# Patient Record
Sex: FEMALE | Race: WHITE
[De-identification: names, ages, dates, MRNs, and addresses within clinical notes are randomized per-mention and may not be internally consistent; named-entity substitution may affect disease eponyms.]

---

## 2020-11-18 ENCOUNTER — HOSPITAL ENCOUNTER (EMERGENCY)
Dept: HOSPITAL 56 - MW.ED | Age: 65
Discharge: NURSING, REHAB OR LONG-TERM CARE | End: 2020-11-18
Payer: MEDICARE

## 2020-11-18 VITALS — HEART RATE: 82 BPM | DIASTOLIC BLOOD PRESSURE: 53 MMHG | SYSTOLIC BLOOD PRESSURE: 102 MMHG

## 2020-11-18 DIAGNOSIS — I10: ICD-10-CM

## 2020-11-18 DIAGNOSIS — Z79.84: ICD-10-CM

## 2020-11-18 DIAGNOSIS — J44.9: ICD-10-CM

## 2020-11-18 DIAGNOSIS — Z79.899: ICD-10-CM

## 2020-11-18 DIAGNOSIS — Z88.8: ICD-10-CM

## 2020-11-18 DIAGNOSIS — E11.9: ICD-10-CM

## 2020-11-18 DIAGNOSIS — E78.5: ICD-10-CM

## 2020-11-18 DIAGNOSIS — E78.00: ICD-10-CM

## 2020-11-18 DIAGNOSIS — U07.1: ICD-10-CM

## 2020-11-18 DIAGNOSIS — I25.2: ICD-10-CM

## 2020-11-18 DIAGNOSIS — Z91.048: ICD-10-CM

## 2020-11-18 DIAGNOSIS — J96.01: Primary | ICD-10-CM

## 2020-11-18 DIAGNOSIS — E03.9: ICD-10-CM

## 2020-11-18 LAB
BUN SERPL-MCNC: 30 MG/DL (ref 7–18)
CHLORIDE SERPL-SCNC: 94 MMOL/L (ref 98–107)
CO2 SERPL-SCNC: 23.1 MMOL/L (ref 21–32)
GLUCOSE SERPL-MCNC: 174 MG/DL (ref 74–106)
POTASSIUM SERPL-SCNC: 3.9 MMOL/L (ref 3.5–5.1)
SODIUM SERPL-SCNC: 132 MMOL/L (ref 136–145)

## 2020-11-18 PROCEDURE — 71275 CT ANGIOGRAPHY CHEST: CPT

## 2020-11-18 PROCEDURE — 80053 COMPREHEN METABOLIC PANEL: CPT

## 2020-11-18 PROCEDURE — 83880 ASSAY OF NATRIURETIC PEPTIDE: CPT

## 2020-11-18 PROCEDURE — 71045 X-RAY EXAM CHEST 1 VIEW: CPT

## 2020-11-18 PROCEDURE — 85025 COMPLETE CBC W/AUTO DIFF WBC: CPT

## 2020-11-18 PROCEDURE — 99285 EMERGENCY DEPT VISIT HI MDM: CPT

## 2020-11-18 PROCEDURE — 93005 ELECTROCARDIOGRAM TRACING: CPT

## 2020-11-18 PROCEDURE — U0002 COVID-19 LAB TEST NON-CDC: HCPCS

## 2020-11-18 PROCEDURE — 84484 ASSAY OF TROPONIN QUANT: CPT

## 2020-11-18 PROCEDURE — 36415 COLL VENOUS BLD VENIPUNCTURE: CPT

## 2020-11-18 NOTE — CT
Indication:



Hypoxia



Technique:



Volumetric multidetector CT images of the chest were obtained after the 

administration of IV contrast.



50 cc Isovue 370



Comparison:



Single view chest November 18, 2018



Findings:



The thoracic inlet and thyroid gland are unremarkable.



The thoracic aorta is nonaneurysmal.



There is no central filling defect to suggest pulmonary embolism.



There are reactive mediastinal and hilar lymph nodes.



There is traction bronchiectasis and central bronchial thickening.



There is moderate to severe emphysematous changes of the upper lobes 

predominantly with peripheral honeycombing likely representing a component 

of pulmonary fibrotic changes. There are likely superimposed ground-glass 

opacities which may represent developing multifocal infiltrate.



There is no evidence of pulmonary mass or suspicious pulmonary nodule.



The partially visualized upper abdominal viscera are within normal limits.



The thoracic vertebral body heights are grossly maintained with minimal 

endplate Schmorl`s defects. There is no significant spondylolisthesis or 

displaced fracture.



Impression:



Extensive emphysematous changes and pulmonary fibrotic changes with likely 

minimal superimposed ground-glass opacity which may represent developing 

infiltrate. No evidence of pulmonary embolus.



Please note that all CT scans at this facility use dose modulation, 

iterative reconstruction, and/or weight-based dosing when appropriate to 

reduce radiation dose to as low as reasonably achievable.



Dictated by Fransico Richard MD @ Nov 18 2020  7:03PM



Signed by Dr. Fransico Richard @ Nov 18 2020  7:21PM

## 2020-11-18 NOTE — CR
Indication:



Dyspnea 



Comparison:



Two-view chest April 1, 2019



Technique:



Single AP view chest



Findings:



There is hyperinflation and chronic interstitial change.



There is no focal consolidation, effusion, or pneumothorax.



The cardiomediastinal silhouette is within normal limits.



The bony thorax is grossly intact.



Impression:



No acute cardiopulmonary abnormality.



Dictated by Fransico Richard MD @ Nov 18 2020  6:02PM



Signed by Dr. Fransico Richard @ Nov 18 2020  6:04PM

## 2020-11-18 NOTE — PCM.SN.2
- Free Text/Narrative


Note: 


12-Lead ECG Interpretation 


Acquired: 5:01 PM


Rhythm: Sinus rhythm


Rate: 89 bpm


Axis: Normal


Intervals: Normal


Ectopy: None


RV Strain: No obvious RV strain pattern.


ST Segments/T-Waves: No notable changes


Acute Ischemic Changes: None apparent


Interpretation: No STEMI

## 2020-11-18 NOTE — EDM.PDOC
<Frankie Delaney - Last Filed: 11/18/20 19:46>





ED HPI GENERAL MEDICAL PROBLEM





- General


Chief Complaint: Respiratory Problem


Stated Complaint: SOB


Time Seen by Provider: 11/18/20 16:42


Source of Information: Reports: Patient, Old Records


History Limitations: Reports: No Limitations





- History of Present Illness


INITIAL COMMENTS - FREE TEXT/NARRATIVE: 


65-year-old female with a past medical history of COPD and hypertension, 

diabetes mellitus, hypothyroidism, hyperlipidemia presenting with shortness of 

breath and infectious symptoms.  She reports a 6-day history of a cough, fever 

at home, scant diarrhea, myalgias, and shortness of breath.  The shortness of 

breath worsened today, which prompted her emergency department presentation.  No

known sick contacts.  She was tested for COVID-19 2 days ago but has not had a 

result yet.





Denies headache, neck pain, chest discomfort, hemoptysis, leg swelling, 

vomiting, hematemesis, bloody stools, dysuria, or hematuria.  She does not use 

oxygen at home for her COPD.





ROS: A 10-point review of systems was negative, except as noted in the HPI (or 

in the ROS section of this note).





Past medical history: Reviewed, no additional pertinent history.


Surgical history: Reviewed in system, no additional pertinent history.


Social history: Reviewed in system, no additional pertinent history.


Family history: Reviewed in system, no additional pertinent history.


________________________________________________________________________________





PHYSICAL EXAM


Vital signs reviewed.  Nursing notes reviewed.


Constitutional: Awake, alert, non-distressed.


Head: Normocephalic, atraumatic.


Eyes: EOMI, conjunctiva normal, no discharge, no scleral icterus.


Ears, Nose, Throat: External ears and nose normal, moist oral mucosa.


Cardiovascular: 2+ radial pulse, capillary refill less than 2 seconds.  No lower

extremity edema.


Pulmonary: Tachypneic, mildly increased work of breathing, no accessory muscle 

use.


Abdomen/GI: Soft, nontender, nondistended, no guarding or rigidity, no masses.


Musculoskeletal: No deformities.


Integumentary: Appropriate color for ethnicity, warm, dry, no pallor or 

jaundice, no rash.


Neurologic: Alert, answering questions appropriately, normal speech, no facial 

droop, moving all extremities well.


Psychiatric: Appropriate mood and affect, normal thought process.





This patient was seen and evaluated during the 2020 SARS-CoV-2 novel coronavirus

pandemic period.  Community viral transmission is ongoing at time of this 

encounter and the emergency department is operating under pandemic response 

procedures.


  ** Center back


Pain Score (Numeric/FACES): 8





- Related Data


                                    Allergies











Allergy/AdvReac Type Severity Reaction Status Date / Time


 


nickel [Nickel] Allergy  Rash Verified 11/18/20 16:50


 


catgut sutures Allergy  Cannot Uncoded 11/18/20 16:50





   Remember  











Home Meds: 


                                    Home Meds





Venlafaxine [Effexor] 150 mg PO DAILY 04/26/14 [History]


atorvaSTATin Calcium [Atorvastatin Calcium] 10 mg PO DAILY 04/26/14 [History]


metFORMIN HCl [Metformin HCl] 4,000 mg PO BID 04/26/14 [History]


Budesonide/Formoterol [Symbicort 160-4.5 MCG] 1 puff INH BID 11/18/20 [History]


Calcium Carbonate [Calcium] 600 mg PO DAILY 11/18/20 [History]


Chlorthalidone 25 mg PO DAILY 11/18/20 [History]


Diltiazem [Tiazac] 240 mg PO DAILY 11/18/20 [History]


Levothyroxine 25 mcg PO DAILY 11/18/20 [History]


Omeprazole 40 mg PO DAILY 11/18/20 [History]











Past Medical History


Cardiovascular History: Reports: High Cholesterol, Hypertension, MI, Stents


Respiratory History: Reports: COPD


Endocrine/Metabolic History: Reports: Diabetes, Type II





- Infectious Disease History


Infectious Disease History: Reports: Chicken Pox, Measles, Mumps





- Past Surgical History


Other Musculoskeletal Surgeries/Procedures:: R ankle, 7 R knee surgeries, 1 L 

knee surgery, Carpel tunnel repair bilat, spinal stenosis with repair





Social & Family History





- Family History


Family Medical History: No Pertinent Family History





- Tobacco Use


Tobacco Use Status *Q: Never Tobacco User





- Caffeine Use


Caffeine Use: Reports: Coffee, Soda





- Recreational Drug Use


Recreational Drug Use: Yes


Recreational Drug Type: Reports: Marijuana/Hashish


Recreational Drug Use Frequency: Socially





ED ROS GENERAL





- Review of Systems


Review Of Systems: See Below





ED EXAM, GENERAL





- Physical Exam


Exam: See Below





Course





- Vital Signs


Text/Narrative:: 


Differential diagnosis includes but is not limited to: COVID-19 pneumonia, 

bacterial pneumonia, sepsis, less likely acute coronary syndrome or congestive 

heart failure, less likely pulmonary embolism, COPD exacerbation, asthma 

exacerbation, and many others.





5:53 PM: Labs show normal cell lines.  Mild hyponatremia 132.  Creatinine mildly

 elevated at 1.2.  Glucose 174 with normal carbon dioxide.  AST mildly elevated 

at 59.  Troponin negative.  Waiting for BNP, COVID test, and chest x-ray.





6:26 PM: Chest x-ray is clear however the patient is persistently hypoxic.  We 

are going to obtain a CT pulmonary angiogram to evaluate for pulmonary embolism 

or other occult pneumonia.  Her COVID-19 test is positive.  We are going to give

 p.o. dexamethasone. IV remdesivir not available as patient will need to be 

transferred to another hospital as we have no capacity here.





The patient remained in the ED through the end of my shift. Refer to my 

colleague Dr. Mayo's note for the disposition.





Departure





- Departure


Disposition: DC/Tfer to Acute [facility]


Condition: Good


Clinical Impression: 


 Hypoxia, Acute respiratory failure due to COVID-19








- Discharge Information


Referrals: 


Alberto Khan MD [Primary Care Provider] - 


Forms:  ED Department Discharge





Sepsis Event Note (ED)





- Evaluation


Sepsis Screening Result: No Definite Risk





<Carrillo Mayo - Last Filed: 11/18/20 19:55>





Course





- Vital Signs


Last Recorded V/S: 


                                Last Vital Signs











Temp  99.9 F   11/18/20 19:41


 


Pulse  84   11/18/20 19:41


 


Resp  20   11/18/20 19:41


 


BP  126/85   11/18/20 19:41


 


Pulse Ox  94 L  11/18/20 19:41














- Orders/Labs/Meds


Orders: 


                               Active Orders 24 hr











 Category Date Time Status


 


 EKG Documentation Completion [RC] STAT Care  11/18/20 17:07 Active


 


 Pulse Oximetry [RC] ASDIRECTED Care  11/18/20 17:07 Active


 


 CORONAVIRUS COVID-19 PCR PHL Stat Lab  11/18/20 17:37 Received


 


 Sodium Chloride 0.9% [Saline Flush] Med  11/18/20 17:07 Active





 10 ml FLUSH ASDIRECTED PRN   


 


 Sodium Chloride 0.9% [Saline Flush] Med  11/18/20 17:07 Active





 2.5 ml FLUSH ASDIRECTED PRN   


 


 Saline Lock Insert [OM.PC] Stat Oth  11/18/20 17:07 Ordered








                                Medication Orders





Sodium Chloride (Saline Flush)  10 ml FLUSH ASDIRECTED PRN


   PRN Reason: Keep Vein Open


   Last Admin: 11/18/20 18:28  Dose: 10 ml


   Documented by: [id number]


Sodium Chloride (Saline Flush)  2.5 ml FLUSH ASDIRECTED PRN


   PRN Reason: Keep Vein Open


   Last Admin: 11/18/20 18:28  Dose: 2.5 ml


   Documented by: [id number]








Labs: 


                                Laboratory Tests











  11/18/20 11/18/20 11/18/20 Range/Units





  17:04 17:04 17:04 


 


WBC  6.91    (4.0-11.0)  K/uL


 


RBC  5.01    (4.30-5.90)  M/uL


 


Hgb  15.0    (12.0-16.0)  g/dL


 


Hct  43.3    (36.0-46.0)  %


 


MCV  86.4    (80.0-98.0)  fL


 


MCH  29.9    (27.0-32.0)  pg


 


MCHC  34.6    (31.0-37.0)  g/dL


 


RDW Std Deviation  46.5    (28.0-62.0)  fl


 


RDW Coeff of Maximo  15    (11.0-15.0)  %


 


Plt Count  268    (150-400)  K/uL


 


MPV  10.40    (7.40-12.00)  fL


 


Neut % (Auto)  67.0    (48.0-80.0)  %


 


Lymph % (Auto)  19.8    (16.0-40.0)  %


 


Mono % (Auto)  13.0    (0.0-15.0)  %


 


Eos % (Auto)  0.1    (0.0-7.0)  %


 


Baso % (Auto)  0.1    (0.0-1.5)  %


 


Neut # (Auto)  4.6    (1.4-5.7)  K/uL


 


Lymph # (Auto)  1.4    (0.6-2.4)  K/uL


 


Mono # (Auto)  0.9 H    (0.0-0.8)  K/uL


 


Eos # (Auto)  0.0    (0.0-0.7)  K/uL


 


Baso # (Auto)  0.0    (0.0-0.1)  K/uL


 


Nucleated RBC %  0.0    /100WBC


 


Nucleated RBCs #  0    K/uL


 


Sodium   132 L   (136-145)  mmol/L


 


Potassium   3.9   (3.5-5.1)  mmol/L


 


Chloride   94 L   ()  mmol/L


 


Carbon Dioxide   23.1   (21.0-32.0)  mmol/L


 


BUN   30 H   (7.0-18.0)  mg/dL


 


Creatinine   1.2 H   (0.6-1.0)  mg/dL


 


Est Cr Clr Drug Dosing   32.68   mL/min


 


Estimated GFR (MDRD)   44.8   ml/min


 


Glucose   174 H   ()  mg/dL


 


Calcium   9.1   (8.5-10.1)  mg/dL


 


Total Bilirubin   0.5   (0.2-1.0)  mg/dL


 


AST   59 H   (15-37)  IU/L


 


ALT   55   (14-63)  IU/L


 


Alkaline Phosphatase   91   ()  U/L


 


Troponin I   < 0.050   (0.000-0.056)  ng/mL


 


B-Natriuretic Peptide    9  (<100)  PG/ML


 


Total Protein   7.9   (6.4-8.2)  g/dL


 


Albumin   3.5   (3.4-5.0)  g/dL


 


Globulin   4.4 H   (2.6-4.0)  g/dL


 


Albumin/Globulin Ratio   0.8 L   (0.9-1.6)  


 


SARS CoV-2 RNA Rapid DANIELE     (NEGATIVE)  














  11/18/20 Range/Units





  17:37 


 


WBC   (4.0-11.0)  K/uL


 


RBC   (4.30-5.90)  M/uL


 


Hgb   (12.0-16.0)  g/dL


 


Hct   (36.0-46.0)  %


 


MCV   (80.0-98.0)  fL


 


MCH   (27.0-32.0)  pg


 


MCHC   (31.0-37.0)  g/dL


 


RDW Std Deviation   (28.0-62.0)  fl


 


RDW Coeff of Maximo   (11.0-15.0)  %


 


Plt Count   (150-400)  K/uL


 


MPV   (7.40-12.00)  fL


 


Neut % (Auto)   (48.0-80.0)  %


 


Lymph % (Auto)   (16.0-40.0)  %


 


Mono % (Auto)   (0.0-15.0)  %


 


Eos % (Auto)   (0.0-7.0)  %


 


Baso % (Auto)   (0.0-1.5)  %


 


Neut # (Auto)   (1.4-5.7)  K/uL


 


Lymph # (Auto)   (0.6-2.4)  K/uL


 


Mono # (Auto)   (0.0-0.8)  K/uL


 


Eos # (Auto)   (0.0-0.7)  K/uL


 


Baso # (Auto)   (0.0-0.1)  K/uL


 


Nucleated RBC %   /100WBC


 


Nucleated RBCs #   K/uL


 


Sodium   (136-145)  mmol/L


 


Potassium   (3.5-5.1)  mmol/L


 


Chloride   ()  mmol/L


 


Carbon Dioxide   (21.0-32.0)  mmol/L


 


BUN   (7.0-18.0)  mg/dL


 


Creatinine   (0.6-1.0)  mg/dL


 


Est Cr Clr Drug Dosing   mL/min


 


Estimated GFR (MDRD)   ml/min


 


Glucose   ()  mg/dL


 


Calcium   (8.5-10.1)  mg/dL


 


Total Bilirubin   (0.2-1.0)  mg/dL


 


AST   (15-37)  IU/L


 


ALT   (14-63)  IU/L


 


Alkaline Phosphatase   ()  U/L


 


Troponin I   (0.000-0.056)  ng/mL


 


B-Natriuretic Peptide   (<100)  PG/ML


 


Total Protein   (6.4-8.2)  g/dL


 


Albumin   (3.4-5.0)  g/dL


 


Globulin   (2.6-4.0)  g/dL


 


Albumin/Globulin Ratio   (0.9-1.6)  


 


SARS CoV-2 RNA Rapid DANIELE  POSITIVE H  (NEGATIVE)  











Meds: 


Medications











Generic Name Dose Route Start Last Admin





  Trade Name Freq  PRN Reason Stop Dose Admin


 


Sodium Chloride  10 ml  11/18/20 17:07  11/18/20 18:28





  Saline Flush  FLUSH   10 ml





  ASDIRECTED PRN   Administration





  Keep Vein Open  


 


Sodium Chloride  2.5 ml  11/18/20 17:07  11/18/20 18:28





  Saline Flush  FLUSH   2.5 ml





  ASDIRECTED PRN   Administration





  Keep Vein Open  














Discontinued Medications














Generic Name Dose Route Start Last Admin





  Trade Name Freq  PRN Reason Stop Dose Admin


 


Acetaminophen  1,000 mg  11/18/20 18:18  11/18/20 18:28





  Tylenol Extra Strength  PO  11/18/20 18:19  1,000 mg





  ONETIME ONE   Administration


 


Dexamethasone  6 mg  11/18/20 18:25  11/18/20 19:34





  Dexamethasone  PO  11/18/20 18:26  6 mg





  ONETIME ONE   Administration


 


Remdesivir 200 mg/ Sodium  250 mls @ 250 mls/hr  11/18/20 18:26  11/18/20 18:56





  Chloride  IV  11/18/20 18:27  Not Given





  ONETIME ONE  


 


Ibuprofen  400 mg  11/18/20 18:18  11/18/20 18:28





  Motrin  PO  11/18/20 18:19  400 mg





  ONETIME ONE   Administration


 


Iopamidol  50 ml  11/18/20 19:01  11/18/20 19:02





  Isovue Multipack-370 (76%)  IVPUSH  11/18/20 19:02  50 ml





  ONETIME STA   Administration














Departure





- Departure


Time of Disposition: 19:54


Condition: Good





Sepsis Event Note (ED)





- Focused Exam


Vital Signs: 


                                   Vital Signs











  Temp Pulse Resp BP Pulse Ox


 


 11/18/20 19:41  99.9 F  84  20  126/85  94 L


 


 11/18/20 18:21   88  18  122/65  94 L


 


 11/18/20 17:51   90   120/63  90 L


 


 11/18/20 17:01  96.8 F L  92  16  145/68 H  93 L














- Assessment/Plan


Assessment:: 


Pt received in signout from Dr. Delaney at 1900.  PT is a 65yoF with a h/o COPD 

presenting with acute hypoxic respiratory failure in the setting of COVID-19 

infection. Pt's RA saturation was in the low 80s with unremarkable CXR.  Given 

this CTPA is pending to assess for any PE.  Labs are w/out evidence of right 

heart strain. Pt is now satting well on NC.  Pt will need to be transferred as 

we do not have capacity for additional COVID19 admissions.





1930: CT pulmonary angiography demonstrates emphysematous changes and scattered 

groundglass opacities consistent with COVID-19.  There is no PE.  Patient 

relatively comfortable on 3 L nasal cannula at this time.  She states that she 

takes Advair and frequent doses of prednisone for her COPD.  She has a rescue 

inhaler but almost never needs it.  She has been on prednisone more often 

recently in an attempt to control her chronic cough.  She says that it worked 

for a few days and then stopped working again.  Patient consents to transfer we 

will start the transfer process.  On my assessment her WOB is good, she is 

breathing comfortably on 4 L NC with O2 sats 93-95.  She is speaking in full 

complete sentences.





1934: [facility] and [facility] are [facility] at capacity.  [facility] has only 3 COVID beds left and 5 COVID patients waiting in their ED so 

a bed is unlikely.  Pt discussed with [facility].   The patient was accepted 

for transfer by Dr. Fields.  Patient has already been given decadron.  Dr. Fields and 

I agree that pt would be a candidate for Remdesivir.  Unfortunately, because our

 local supply is so limited, hospital policy will not allow us to give 

Remdesivir to a patient that is being transferred.  She will need to receive it 

at [place].  Pt is felt stable for transfer at this time.  We are awaiting bed 

assignment.

## 2021-04-22 ENCOUNTER — HOSPITAL ENCOUNTER (EMERGENCY)
Dept: HOSPITAL 56 - MW.ED | Age: 66
Discharge: HOME | End: 2021-04-22
Payer: MEDICARE

## 2021-04-22 VITALS — SYSTOLIC BLOOD PRESSURE: 130 MMHG | DIASTOLIC BLOOD PRESSURE: 66 MMHG | HEART RATE: 92 BPM

## 2021-04-22 DIAGNOSIS — M54.32: Primary | ICD-10-CM

## 2021-04-22 PROCEDURE — 99283 EMERGENCY DEPT VISIT LOW MDM: CPT

## 2021-04-22 PROCEDURE — 96372 THER/PROPH/DIAG INJ SC/IM: CPT

## 2021-04-22 NOTE — EDM.PDOC
ED HPI GENERAL MEDICAL PROBLEM





- General


Chief Complaint: Back Pain or Injury


Stated Complaint: BACK SPASMS


Time Seen by Provider: 04/22/21 03:09





- History of Present Illness


INITIAL COMMENTS - FREE TEXT/NARRATIVE: 











ED HPI GENERAL MEDICAL PROBLEM





- General


Chief Complaint: Back Pain or Injury


Stated Complaint: LEFT FOOT SHARP PAIN


Time Seen by Provider: 04/22/21 03:10





- History of Present Illness


INITIAL COMMENTS - FREE TEXT/NARRATIVE: 





History of present illness:


[] Patient is 2 days of severe pain in left hip.  It radiates down the left leg.

  It causes spasm.  Changing the position over and over helps a little bit.  The

 patient's had episodes in the past and they responded to anti-inflammatory 

medicine the muscle relaxers.  The patient has no neurologic findings.  She also

 says she has incontinence of urine but she has control of your sphincter to 

some degree but she just cannot make it to the bathroom in time and this is 

chronic and not new.  The patient has no loss of control of her bowels.  She has

 no sensory or motor loss below the waist.  No dysuria or other urinary symptoms

 and no new urinary symptoms.  There is been no history of injury and she has no

 history of malignancy.





Review of systems: 


As per history of present illness and below otherwise all systems reviewed and 

negative.





Past medical history: 


As per history of present illness and as reviewed below otherwise 

noncontributory.





Surgical history: 


As per history of present illness and as reviewed below otherwise 

noncontributory.





Social history: 


No reported history of drug or alcohol abuse.





Family history: 


As per history of present illness and as reviewed below otherwise nonc

ontributory.





Physical exam:


Constitutional - well developed, well-nourished and in no acute distress


HEENT - normocephalic, no evidence of trauma - external nose and mouth normal - 

no mass in neck and no JVD - mucosae moist





EYES - full EOM, PERRL, no icterus - no evidence of inflammation, injection, or 

drainage





Respiratory - no respiratory distress, equal bilateral expansion, lungs clear to

 auscultation and no abnormal lung sounds





Cardiovascular - Regular Rhythm with S1 and S2 appreciated and no murmur, gallop

 or rub.





GI - abdomen soft without distension or organomegaly - normal bowel sounds - no 

guard or rebound





Musculoskeletal straight leg raise on the right to 45 degrees causes a little 

bit of pain crossover to the left side of the hip.  She is tender in the sciatic

 region and posterior thigh.  Straight leg raise on the left is negative.  No 

gross deformity of long bones or joints - no tenderness, swelling or edema





Neurologic - Alert and oriented times four - CN II-XII grossly intact - motor 

sensory and coordination symmetrically normal





Psychiatric - appropriate mood and affect with normal thought content





Hematologic - No petechiae or purpura - mucosa appropriate color and sclera not 

pale - normal nail bed color and refill





Integument - no rash or evidence of trauma - normal turgor





Diagnostics:


[]





Therapeutics:


[]





Impression: 


[]





Plan:


[]





Definitive disposition and diagnosis as appropriate pending reevaluation and 

review of above.








  ** Left Leg


Pain Score (Numeric/FACES): 9





- Related Data


                                    Allergies











Allergy/AdvReac Type Severity Reaction Status Date / Time


 


No Known Allergies Allergy   Verified 04/22/21 02:55











Home Meds: 


                                    Home Meds





diazePAM [Valium] 5 mg PO TID PRN #15 tab 04/22/21 [Rx]











Past Medical History





- Past Health History


Medical/Surgical History: Denies Medical/Surgical History


HEENT History: Reports: None


Cardiovascular History: Reports: None


Respiratory History: Reports: None


Gastrointestinal History: Reports: None


Genitourinary History: Reports: None


OB/GYN History: Reports: None


Musculoskeletal History: Reports: None


Neurological History: Reports: None


Psychiatric History: Reports: None


Endocrine/Metabolic History: Reports: None


Hematologic History: Reports: None


Immunologic History: Reports: None


Oncologic (Cancer) History: Reports: None


Dermatologic History: Reports: None





- Infectious Disease History


Infectious Disease History: Reports: None





- Past Surgical History


Head Surgeries/Procedures: Reports: None


HEENT Surgical History: Reports: Tonsillectomy


GI Surgical History: Reports: None





Social & Family History





- Family History


Family Medical History: No Pertinent Family History





- Tobacco Use


Tobacco Use Status *Q: Never Tobacco User





- Caffeine Use


Caffeine Use: Reports: None





- Recreational Drug Use


Recreational Drug Use: No





ED ROS GENERAL





- Review of Systems


Review Of Systems: Comprehensive ROS is negative, except as noted in HPI.





ED EXAM, GENERAL





- Physical Exam


Exam: See Below


Free Text/Narrative:: 





My physical exam is in the HPI





Course





- Vital Signs


Last Recorded V/S: 





                                Last Vital Signs











Temp  36.1 C   04/22/21 02:55


 


Pulse  95   04/22/21 02:55


 


Resp  18   04/22/21 02:55


 


BP  128/49 L  04/22/21 02:55


 


Pulse Ox  97   04/22/21 02:55














- Orders/Labs/Meds


Orders: 





                               Active Orders 24 hr











 Category Date Time Status


 


 Ketorolac [Toradol] Med  04/22/21 03:19 Once





 30 mg IM ONETIME ONE   


 


 diazePAM [Valium] Med  04/22/21 03:20 Once





 5 mg PO ONETIME ONE   








                                Medication Orders





Diazepam (Diazepam 2 Mg Tab)  5 mg PO ONETIME ONE


   Stop: 04/22/21 03:21


Ketorolac Tromethamine (Ketorolac 30 Mg/Ml Sdv)  30 mg IM ONETIME ONE


   Stop: 04/22/21 03:20








Meds: 





Medications











Generic Name Dose Route Start Last Admin





  Trade Name John  PRN Reason Stop Dose Admin


 


Diazepam  5 mg  04/22/21 03:20 





  Diazepam 2 Mg Tab  PO  04/22/21 03:21 





  ONETIME ONE  


 


Ketorolac Tromethamine  30 mg  04/22/21 03:19 





  Ketorolac 30 Mg/Ml Sdv  IM  04/22/21 03:20 





  ONETIME ONE  














Departure





- Departure


Time of Disposition: 03:45


Disposition: Home, Self-Care 01


Condition: Good


Clinical Impression: 


 Sciatica








- Discharge Information


Instructions:  Sciatica, Easy-to-Read


Referrals: 


Anamaria Lares NP [Primary Care Provider] - 


Additional Instructions: 


Take 500 mg naproxen or 600 mg ibuprofen as directed in the over the counter 

forearm along with a muscle relaxer.  Be careful a muscle relaxer will also make

you relaxed and may make you tired.  





[facility]


[address] [place] [address]


Phone: [phone]


Fax: [phone]








[address] [address]


Phone: [phone]


Fax: [phone]





The following information is given to patients seen in the emergency department 

who are being discharged to home. This information is to outline your options 

for follow-up care. We provide all patients seen in our emergency department 

with a follow-up referral.





The need for follow-up, as well as the timing and circumstances, are variable 

depending upon the specifics of your emergency department visit.





If you don't have a primary care physician on staff, we will provide you with a 

referral. We always advise you to contact your personal physician following an 

emergency department visit to inform them of the circumstance of the visit and 

for follow-up with them and/or the need for any referrals to a consulting 

specialist.





The emergency department will also refer you to a specialist when appropriate. 

This referral assures that you have the opportunity for follow-up care with a 

specialist. All of these measure are taken in an effort to provide you with 

optimal care, which includes your follow-up.





Under all circumstances we always encourage you to contact your private 

physician who remains a resource for coordinating your care. When calling for 

follow-up care, please make the office aware that this follow-up is from your 

recent emergency room visit. If for any reason you are refused follow-up, please

contact the [facility] Emergency Department

at [phone] and asked to speak to the emergency department charge nurse.








Sepsis Event Note (ED)





- Evaluation


Sepsis Screening Result: No Definite Risk





- Focused Exam


Vital Signs: 





                                   Vital Signs











  Temp Pulse Resp BP Pulse Ox


 


 04/22/21 02:55  36.1 C  95  18  128/49 L  97














- My Orders


Last 24 Hours: 





My Active Orders





04/22/21 03:19


Ketorolac [Toradol]   30 mg IM ONETIME ONE 





04/22/21 03:20


diazePAM [Valium]   5 mg PO ONETIME ONE 














- Assessment/Plan


Last 24 Hours: 





My Active Orders





04/22/21 03:19


Ketorolac [Toradol]   30 mg IM ONETIME ONE 





04/22/21 03:20


diazePAM [Valium]   5 mg PO ONETIME ONE 














  ** lower back


Pain Score (Numeric/FACES): 10





- Related Data


                                    Allergies











Allergy/AdvReac Type Severity Reaction Status Date / Time


 


nickel [Nickel] Allergy  Rash Verified 04/22/21 02:55


 


catgut sutures Allergy  Cannot Uncoded 04/22/21 02:55





   Remember  











Home Meds: 


                                    Home Meds





Venlafaxine [Effexor] 150 mg PO DAILY 04/26/14 [History]


atorvaSTATin Calcium [Atorvastatin Calcium] 10 mg PO DAILY 04/26/14 [History]


metFORMIN HCl [Metformin HCl] 4,000 mg PO BID 04/26/14 [History]


Budesonide/Formoterol [Symbicort 160-4.5 MCG] 1 puff INH BID 11/18/20 [History]


Calcium Carbonate [Calcium] 600 mg PO DAILY 11/18/20 [History]


Chlorthalidone 25 mg PO DAILY 11/18/20 [History]


Diltiazem [Tiazac] 240 mg PO DAILY 11/18/20 [History]


Levothyroxine 25 mcg PO DAILY 11/18/20 [History]


Omeprazole 40 mg PO DAILY 11/18/20 [History]


diazePAM [Valium] 5 mg PO TID PRN #15 tab 04/22/21 [Rx]











Past Medical History


HEENT History: Reports: None


Cardiovascular History: Reports: High Cholesterol, Hypertension, MI, Stents


Respiratory History: Reports: COPD


Gastrointestinal History: Reports: None


Genitourinary History: Reports: None


OB/GYN History: Reports: None


Musculoskeletal History: Reports: None


Neurological History: Reports: None


Psychiatric History: Reports: None


Endocrine/Metabolic History: Reports: Diabetes, Type II


Insulin Pump Model and : None


Hematologic History: Reports: None


Immunologic History: Reports: None


Oncologic (Cancer) History: Reports: None


Dermatologic History: Reports: None





- Infectious Disease History


Infectious Disease History: Reports: Chicken Pox, Measles, Mumps





- Past Surgical History


Other Musculoskeletal Surgeries/Procedures:: R ankle, 7 R knee surgeries, 1 L 

knee surgery, Carpel tunnel repair bilat, spinal stenosis with repair





Social & Family History





- Family History


Family Medical History: No Pertinent Family History





- Caffeine Use


Caffeine Use: Reports: Coffee, Soda





- Recreational Drug Use


Recreational Drug Use: No





ED ROS GENERAL





- Review of Systems


Review Of Systems: Comprehensive ROS is negative, except as noted in HPI.





ED EXAM, GENERAL





- Physical Exam


Exam: See Below


Free Text/Narrative:: 





My physical exam is in the HPI





Course





- Vital Signs


Text/Narrative:: 











ED HPI GENERAL MEDICAL PROBLEM





- General


Chief Complaint: Back Pain or Injury


Stated Complaint: LEFT FOOT SHARP PAIN


Time Seen by Provider: 04/22/21 03:10





- History of Present Illness


INITIAL COMMENTS - FREE TEXT/NARRATIVE: 





History of present illness:


[] Patient is 2 days of severe pain in left hip.  It radiates down the left leg.

  It causes spasm.  Changing the position over and over helps a little bit.  The

 patient's had episodes in the past and they responded to anti-inflammatory 

medicine the muscle relaxers.  The patient has no neurologic findings.  She also

 says she has incontinence of urine but she has control of your sphincter to 

some degree but she just cannot make it to the bathroom in time and this is 

chronic and not new.  The patient has no loss of control of her bowels.  She has

 no sensory or motor loss below the waist.  No dysuria or other urinary symptoms

 and no new urinary symptoms.  There is been no history of injury and she has no

 history of malignancy.





Review of systems: 


As per history of present illness and below otherwise all systems reviewed and 

negative.





Past medical history: 


As per history of present illness and as reviewed below otherwise 

noncontributory.





Surgical history: 


As per history of present illness and as reviewed below otherwise noncont

ributory.





Social history: 


No reported history of drug or alcohol abuse.





Family history: 


As per history of present illness and as reviewed below otherwise 

noncontributory.





Physical exam:


Constitutional - well developed, well-nourished and in no acute distress


HEENT - normocephalic, no evidence of trauma - external nose and mouth normal - 

no mass in neck and no JVD - mucosae moist





EYES - full EOM, PERRL, no icterus - no evidence of inflammation, injection, or 

drainage





Respiratory - no respiratory distress, equal bilateral expansion, lungs clear to

 auscultation and no abnormal lung sounds





Cardiovascular - Regular Rhythm with S1 and S2 appreciated and no murmur, gallop

 or rub.





GI - abdomen soft without distension or organomegaly - normal bowel sounds - no 

guard or rebound





Musculoskeletal straight leg raise on the right to 45 degrees causes a little 

bit of pain crossover to the left side of the hip.  She is tender in the sciatic

 region and posterior thigh.  Straight leg raise on the left is negative.  No 

gross deformity of long bones or joints - no tenderness, swelling or edema





Neurologic - Alert and oriented times four - CN II-XII grossly intact - motor 

sensory and coordination symmetrically normal





Psychiatric - appropriate mood and affect with normal thought content





Hematologic - No petechiae or purpura - mucosa appropriate color and sclera not 

pale - normal nail bed color and refill





Integument - no rash or evidence of trauma - normal turgor





Diagnostics:


[]





Therapeutics:


[]





Impression: 


[]





Plan:


[]





Definitive disposition and diagnosis as appropriate pending reevaluation and 

review of above.








  ** Left Leg


Pain Score (Numeric/FACES): 9





- Related Data


                                    Allergies











Allergy/AdvReac Type Severity Reaction Status Date / Time


 


No Known Allergies Allergy   Verified 04/22/21 02:55











Home Meds: 


                                    Home Meds





diazePAM [Valium] 5 mg PO TID PRN #15 tab 04/22/21 [Rx]











Past Medical History





- Past Health History


Medical/Surgical History: Denies Medical/Surgical History


HEENT History: Reports: None


Cardiovascular History: Reports: None


Respiratory History: Reports: None


Gastrointestinal History: Reports: None


Genitourinary History: Reports: None


OB/GYN History: Reports: None


Musculoskeletal History: Reports: None


Neurological History: Reports: None


Psychiatric History: Reports: None


Endocrine/Metabolic History: Reports: None


Hematologic History: Reports: None


Immunologic History: Reports: None


Oncologic (Cancer) History: Reports: None


Dermatologic History: Reports: None





- Infectious Disease History


Infectious Disease History: Reports: None





- Past Surgical History


Head Surgeries/Procedures: Reports: None


HEENT Surgical History: Reports: Tonsillectomy


GI Surgical History: Reports: None





Social & Family History





- Family History


Family Medical History: No Pertinent Family History





- Tobacco Use


Tobacco Use Status *Q: Never Tobacco User





- Caffeine Use


Caffeine Use: Reports: None





- Recreational Drug Use


Recreational Drug Use: No





ED ROS GENERAL





- Review of Systems


Review Of Systems: Comprehensive ROS is negative, except as noted in HPI.





ED EXAM, GENERAL





- Physical Exam


Exam: See Below


Free Text/Narrative:: 





My physical exam is in the HPI





Course





- Vital Signs


Last Recorded V/S: 





                                Last Vital Signs











Temp  36.1 C   04/22/21 02:55


 


Pulse  95   04/22/21 02:55


 


Resp  18   04/22/21 02:55


 


BP  128/49 L  04/22/21 02:55


 


Pulse Ox  97   04/22/21 02:55














- Orders/Labs/Meds


Orders: 





                               Active Orders 24 hr











 Category Date Time Status


 


 Ketorolac [Toradol] Med  04/22/21 03:19 Once





 30 mg IM ONETIME ONE   


 


 diazePAM [Valium] Med  04/22/21 03:20 Once





 5 mg PO ONETIME ONE   








                                Medication Orders





Diazepam (Diazepam 2 Mg Tab)  5 mg PO ONETIME ONE


   Stop: 04/22/21 03:21


Ketorolac Tromethamine (Ketorolac 30 Mg/Ml Sdv)  30 mg IM ONETIME ONE


   Stop: 04/22/21 03:20








Meds: 





Medications











Generic Name Dose Route Start Last Admin





  Trade Name John  PRN Reason Stop Dose Admin


 


Diazepam  5 mg  04/22/21 03:20 





  Diazepam 2 Mg Tab  PO  04/22/21 03:21 





  ONETIME ONE  


 


Ketorolac Tromethamine  30 mg  04/22/21 03:19 





  Ketorolac 30 Mg/Ml Sdv  IM  04/22/21 03:20 





  ONETIME ONE  














Departure





- Departure


Time of Disposition: 03:45


Disposition: Home, Self-Care 01


Condition: Good


Clinical Impression: 


 Sciatica








- Discharge Information


Instructions:  Sciatica, Easy-to-Read


Referrals: 


Anamaria Lares NP [Primary Care Provider] - 


Additional Instructions: 


Take 500 mg naproxen or 600 mg ibuprofen as directed in the over the counter 

forearm along with a muscle relaxer.  Be careful a muscle relaxer will also make

you relaxed and may make you tired.  





[facility]


[address] [place] [address]


Phone: [phone]


Fax: [phone]








[place] [address]


Phone: [phone]


Fax: [phone]





The following information is given to patients seen in the emergency department 

who are being discharged to home. This information is to outline your options 

for follow-up care. We provide all patients seen in our emergency department 

with a follow-up referral.





The need for follow-up, as well as the timing and circumstances, are variable 

depending upon the specifics of your emergency department visit.





If you don't have a primary care physician on staff, we will provide you with a 

referral. We always advise you to contact your personal physician following an 

emergency department visit to inform them of the circumstance of the visit and 

for follow-up with them and/or the need for any referrals to a consulting 

ecialist.





The emergency department will also refer you to a specialist when appropriate. 

This referral assures that you have the opportunity for follow-up care with a 

specialist. All of these measure are taken in an effort to provide you with 

optimal care, which includes your follow-up.





Under all circumstances we always encourage you to contact your private 

physician who remains a resource for coordinating your care. When calling for 

follow-up care, please make the office aware that this follow-up is from your 

recent emergency room visit. If for any reason you are refused follow-up, please

contact the [facility] Emergency Department

at [phone] and asked to speak to the emergency department charge nurse.








Sepsis Event Note (ED)





- Evaluation


Sepsis Screening Result: No Definite Risk





- Focused Exam


Vital Signs: 





                                   Vital Signs











  Temp Pulse Resp BP Pulse Ox


 


 04/22/21 02:55  36.1 C  95  18  128/49 L  97














- My Orders


Last 24 Hours: 





My Active Orders





04/22/21 03:19


Ketorolac [Toradol]   30 mg IM ONETIME ONE 





04/22/21 03:20


diazePAM [Valium]   5 mg PO ONETIME ONE 














- Assessment/Plan


Last 24 Hours: 





My Active Orders





04/22/21 03:19


Ketorolac [Toradol]   30 mg IM ONETIME ONE 





04/22/21 03:20


diazePAM [Valium]   5 mg PO ONETIME ONE 














Last Recorded V/S: 





                                Last Vital Signs











Temp  36.6 C   04/22/21 02:55


 


Pulse  100   04/22/21 02:55


 


Resp  18   04/22/21 02:55


 


BP  125/98 H  04/22/21 02:55


 


Pulse Ox  97   04/22/21 02:55














- Orders/Labs/Meds


Meds: 





Medications














Discontinued Medications














Generic Name Dose Route Start Last Admin





  Trade Name John  PRN Reason Stop Dose Admin


 


Diazepam  5 mg  04/22/21 03:31 





  Diazepam 2 Mg Tab  PO  04/22/21 03:32 





  ONETIME ONE  


 


Ketorolac Tromethamine  30 mg  04/22/21 03:31 





  Ketorolac 30 Mg/Ml Sdv  IM  04/22/21 03:32 





  ONETIME ONE  


 


Ketorolac Tromethamine  Confirm  04/22/21 03:32 





  Ketorolac 30 Mg/Ml Sdv  Administered  04/22/21 03:33 





  Dose  





  30 mg  





  .ROUTE  





  .STK-MED ONE  














Departure





- Departure


Time of Disposition: 03:36


Disposition: Home, Self-Care 01


Condition: Good


Clinical Impression: 


 Sciatica








- Discharge Information


Instructions:  Sciatica


Referrals: 


Alberto Khan MD [Primary Care Provider] - 


Additional Instructions: 


Use over-the-counter anti-inflammatory medicines and the muscle relaxers.  Be 

careful the Valium may cause sedation.





[facility]


[address] [place] [address]


Phone: [phone]


Fax: [phone]








[facility]


[address] [place] [address]


Phone: [phone]


Fax: [phone]





The following information is given to patients seen in the emergency department 

who are being discharged to home. This information is to outline your options 

for follow-up care. We provide all patients seen in our emergency department 

with a follow-up referral.





The need for follow-up, as well as the timing and circumstances, are variable 

depending upon the specifics of your emergency department visit.





If you don't have a primary care physician on staff, we will provide you with a 

referral. We always advise you to contact your personal physician following an 

emergency department visit to inform them of the circumstance of the visit and 

for follow-up with them and/or the need for any referrals to a consulting 

specialist.





The emergency department will also refer you to a specialist when appropriate. 

This referral assures that you have the opportunity for follow-up care with a 

specialist. All of these measure are taken in an effort to provide you with 

optimal care, which includes your follow-up.





Under all circumstances we always encourage you to contact your private 

physician who remains a resource for coordinating your care. When calling for 

follow-up care, please make the office aware that this follow-up is from your 

recent emergency room visit. If for any reason you are refused follow-up, please

contact the [facility] Emergency Department

at [phone] and asked to speak to the emergency department charge nurse.








Sepsis Event Note (ED)





- Evaluation


Sepsis Screening Result: No Definite Risk





- Focused Exam


Vital Signs: 





                                   Vital Signs











  Temp Pulse Resp BP Pulse Ox


 


 04/22/21 02:55  36.6 C  100  18  125/98 H  97

## 2021-04-25 ENCOUNTER — HOSPITAL ENCOUNTER (EMERGENCY)
Dept: HOSPITAL 56 - MW.ED | Age: 66
Discharge: HOME | End: 2021-04-25
Payer: MEDICARE

## 2021-04-25 VITALS — HEART RATE: 92 BPM | SYSTOLIC BLOOD PRESSURE: 136 MMHG | DIASTOLIC BLOOD PRESSURE: 76 MMHG

## 2021-04-25 DIAGNOSIS — M54.41: Primary | ICD-10-CM

## 2021-04-25 DIAGNOSIS — J44.9: ICD-10-CM

## 2021-04-25 DIAGNOSIS — Z95.5: ICD-10-CM

## 2021-04-25 DIAGNOSIS — Z91.048: ICD-10-CM

## 2021-04-25 DIAGNOSIS — I25.2: ICD-10-CM

## 2021-04-25 DIAGNOSIS — E11.9: ICD-10-CM

## 2021-04-25 DIAGNOSIS — Z79.899: ICD-10-CM

## 2021-04-25 DIAGNOSIS — Z79.84: ICD-10-CM

## 2021-04-25 DIAGNOSIS — I10: ICD-10-CM

## 2021-04-25 DIAGNOSIS — M54.42: ICD-10-CM

## 2021-04-25 DIAGNOSIS — N39.0: ICD-10-CM

## 2021-04-25 DIAGNOSIS — E78.00: ICD-10-CM

## 2021-04-25 PROCEDURE — 87186 SC STD MICRODIL/AGAR DIL: CPT

## 2021-04-25 PROCEDURE — 99284 EMERGENCY DEPT VISIT MOD MDM: CPT

## 2021-04-25 PROCEDURE — 87086 URINE CULTURE/COLONY COUNT: CPT

## 2021-04-25 PROCEDURE — 72131 CT LUMBAR SPINE W/O DYE: CPT

## 2021-04-25 PROCEDURE — 96372 THER/PROPH/DIAG INJ SC/IM: CPT

## 2021-04-25 PROCEDURE — 81001 URINALYSIS AUTO W/SCOPE: CPT

## 2021-04-25 PROCEDURE — 87088 URINE BACTERIA CULTURE: CPT

## 2021-04-25 NOTE — CT
INDICATION:



Low back pain. 



TECHNIQUE:



CT lumbar spine without contrast.



COMPARISON:



None



FINDINGS/IMPRESSION:



Vertebrae: Alignment is normal.  There are no fractures or suspicious bony 

lesions.  



Discs and facet joints: Moderate degenerative disc spondylosis at T12-L1. 

Remainder of the disc spaces are unremarkable. Moderate facet joint 

spondylosis at L4-5 and L5-S1.  no other findings to explain pain. 



Extraspinal findings: Prevertebral soft tissues and visualized 

retroperitoneum are unremarkable. Chest



Please note that all CT scans at this facility use dose modulation, 

iterative reconstruction, and/or weight-based dosing when appropriate to 

reduce radiation dose to as low as reasonably achievable.



Dictated by Alli Foy MD @ 4/25/2021 4:10:19 PM



Signed by Dr. Alli Foy @ Apr 25 2021  4:10PM

## 2021-04-25 NOTE — EDM.PDOC
ED HPI GENERAL MEDICAL PROBLEM





- General


Chief Complaint: Back Pain or Injury


Stated Complaint: back spasms


Time Seen by Provider: 04/25/21 13:07


Source of Information: Reports: Patient


History Limitations: Reports: No Limitations





- History of Present Illness


INITIAL COMMENTS - FREE TEXT/NARRATIVE: 


HISTORY AND PHYSICAL:





History of present illness:


Patient is a 65-year-old female who presents to the ED today with concern of low

back pain that radiates down the right leg that has been ongoing x4 days.  

Patient states that she was seen in the ED when I had initially started and was 

given Valium and Toradol.  Patient states that these initially helped quite a 

bit but states that despite this, she has had continued low back pain.  Patient 

denies any trauma or injury to her low back.  Patient denies any loss or 

retention of bowel bladder function or saddle anesthesia.  Patient has a history

of COPD, hypertension, hyperlipidemia, and type 2 diabetes.  Patient states that

she has been able to walk but does have pain with doing so.





Patient denies fever, chills, chest pain, shortness of breath, or cough. Denies 

headache, neck stiff ness, change in vision, syncope, or near syncope. Denies 

nausea, vomiting, abdominal pain, diarrhea, constipation, or dysuria. Has not 

noted any blood in urine or stool. Patient has been eating and drinking 

appropriately.





Review of systems: 


As per history of present illness and below otherwise all systems reviewed and 

negative.





Past medical history: 


As per history of present illness and as reviewed below otherwise 

noncontributory.





Surgical history: 


As per history of present illness and as reviewed below otherwise 

noncontributory.





Social history: 


See social history for further information





Family history: 


As per history of present illness and as reviewed below otherwise 

noncontributory.





Physical exam:


General: Patient is alert, oriented, and in no acute distress. Patient sitting 

comfortably on exam table. Triage O2 listed 91%, however on my exam 96% 

otherwise, vitally stable and reviewed by me.


HEENT: Atraumatic, normocephalic, pupils equal and reactive bilaterally, 

negative for conjunctival pallor or scleral icterus, mucous membranes moist, TMs

normal bilaterally, throat clear, neck supple, nontender, trachea midline. No 

drooling or trismus noted. No meningeal signs. No hot potato voice noted. 


Lungs: Clear to auscultation, breath sounds equal bilaterally, chest nontender.


Heart: S1S2, regular rate and rhythm without overt murmur


Abdomen: Soft, nondistended, nontender. Negative for masses or hepatosplenome

parth. Negative for costovertebral tenderness.


Pelvis: Stable nontender.


Genitourinary: Deferred.


Rectal: Deferred.


Skin: Intact, warm, dry. No lesions or rashes noted.


Extremities: No obvious deformity of the complete spine.  No step-offs, crepitus

to palpation of the complete spine.  Patient does have pain of her lower lumbar 

spinous and right sided paraspinous muscle to palpation. SLR intact bilaterally.

Heel/Toe gait intact. Patellar reflexes intact bilaterally.  Patient was able to

ambulate on exam.  Patient does have full range of motion of her thoracic and 

cervical spine but limited range of motion of the lumbar spine due to pain.  

Otherwise, atraumatic, negative for cords or calf pain. Neurovascular 

unremarkable.


Neuro: Awake, alert, oriented. Cranial nerves II through XII unremarkable. 

Cerebellum unremarkable. Motor and sensory unremarkable throughout. Exam 

nonfocal.





Notes:


On initial exam, patient is alert, oriented, and vitally stable.  Patient does 

have moderate to severe pain of her lower lumbar spine and pain with range of 

motion of her lumbar spine.  Patient has negative CVA tenderness on exam and no 

focal deficits noted.  However, due to severity of pain, will obtain lumbar CT 

and urinalysis.





Lumbar CT shows alignment is normal.  There is no fractures or suspicious bone 

lesions.  Daughter at degenerative disc spondylosis at T12-L1.  Remainder of the

disc spaces are unremarkable.  Moderate facet joint spondylosis at L4-5 and L5-

S1.  No other findings to explain pain.  Prevertebral soft tissues and 

visualized retroperitoneum are unremarkable.





Urinalysis shows positive leukocyte Estrace with 2+ bacteria and 22-26 WBC.  

Patient states she does not have any burning with urination and CVA tenderness 

is negative on exam.  However, will treat for urinary tract infection and 

culture urine.





Upon reevaluation of patient, she remains vitally stable and more comfortable 

following therapeutics.  Patient still has pain with range of motion of her 

lumbar spine but states that this is more tolerable given therapeutics today in 

the ED.  Strict return precautions thoroughly discussed with patient.  Discussed

importance for follow-up with a primary care provider.


Voices understanding and is agreeable to plan of care. Denies any further 

questions or concerns at this time.





Diagnostics:


Lumbar CT, UA w culture





Therapeutics:


Toradol, Oxycodone





Prescription:


Keflex, Diclofenac, Tramadol





Impression: 


Low back pain


Urinary tract infection





Plan:


1.  The medication you received today does cause drowsiness, so do not drive for

the remaining day.


2.  When resting please lay on a flat firm surface.  Limit your mobility to 

prevent muscle stiffness.  Get up to ambulate/move around/gentle stretching 

multiple times throughout the day.  May alternate heat and ice to painful areas.


3.  Tylenol as needed for back pain.  Otherwise, take the prescribed tramadol 

and diclofenac as directed.  Diclofenac as an anti-inflammatory medication so do

not take any additional NSAIDs with this medication, such as naproxen, 

ibuprofen, or Aleve.  Tramadol, this medication may cause drowsiness, so do not 

take it while driving or needing to be functioning outside of the home.


4.  Follow-up with your primary care provider as discussed.  Return to the ED as

needed and as discussed.








Definitive disposition and diagnosis as appropriate pending reevaluation and 

review of above.





  ** back


Pain Score (Numeric/FACES): 10





- Related Data


                                    Allergies











Allergy/AdvReac Type Severity Reaction Status Date / Time


 


nickel [Nickel] Allergy  Rash Verified 04/25/21 13:18


 


catgut sutures Allergy  Cannot Uncoded 04/25/21 13:18





   Remember  











Home Meds: 


                                    Home Meds





Venlafaxine [Effexor] 150 mg PO DAILY 04/26/14 [History]


atorvaSTATin Calcium [Atorvastatin Calcium] 10 mg PO DAILY 04/26/14 [History]


metFORMIN HCl [Metformin HCl] 4,000 mg PO BID 04/26/14 [History]


Budesonide/Formoterol [Symbicort 160-4.5 MCG] 1 puff INH BID 11/18/20 [History]


Calcium Carbonate [Calcium] 600 mg PO DAILY 11/18/20 [History]


Chlorthalidone 25 mg PO DAILY 11/18/20 [History]


Diltiazem [Tiazac] 240 mg PO DAILY 11/18/20 [History]


Levothyroxine 25 mcg PO DAILY 11/18/20 [History]


Omeprazole 40 mg PO DAILY 11/18/20 [History]


diazePAM [Valium] 5 mg PO TID PRN #15 tab 04/22/21 [Rx]


Diclofenac Sodium [Voltaren] 75 mg PO BIDMEALS PRN #15 tab.cr 04/25/21 [Rx]


Lidocaine 5% [Lidoderm 5%] 1 patch TOP DAILY PRN #4 patch 04/25/21 [Rx]


cephALEXin [Keflex] 500 mg PO Q8H 7 Days #21 cap 04/25/21 [Rx]


traMADol [Ultram] 50 mg PO Q6H PRN #10 tab 04/25/21 [Rx]











Past Medical History


HEENT History: Reports: None


Cardiovascular History: Reports: High Cholesterol, Hypertension, MI, Stents


Respiratory History: Reports: COPD


Gastrointestinal History: Reports: None


Genitourinary History: Reports: None


OB/GYN History: Reports: None


Musculoskeletal History: Reports: None


Neurological History: Reports: None


Psychiatric History: Reports: None


Endocrine/Metabolic History: Reports: Diabetes, Type II


Insulin Pump Model and : None


Hematologic History: Reports: None


Immunologic History: Reports: None


Oncologic (Cancer) History: Reports: None


Dermatologic History: Reports: None





- Infectious Disease History


Infectious Disease History: Reports: Chicken Pox, Measles, Mumps, Novel Coron

avirus


Other Infectious Disease History: covid 11/2020





- Past Surgical History


Other Musculoskeletal Surgeries/Procedures:: R ankle, 7 R knee surgeries, 1 L 

knee surgery, Carpel tunnel repair bilat, spinal stenosis with repair





Social & Family History





- Family History


Family Medical History: No Pertinent Family History





- Caffeine Use


Caffeine Use: Reports: Coffee, Soda





ED ROS GENERAL





- Review of Systems


Review Of Systems: Comprehensive ROS is negative, except as noted in HPI.





ED EXAM, GENERAL





- Physical Exam


Exam: See Below (see dictation)





Course





- Vital Signs


Last Recorded V/S: 


                                Last Vital Signs











Temp  97.0 F   04/25/21 13:19


 


Pulse  92   04/25/21 13:19


 


Resp  18   04/25/21 13:19


 


BP  136/76   04/25/21 13:19


 


Pulse Ox  91 L  04/25/21 13:19














- Orders/Labs/Meds


Orders: 


                               Active Orders 24 hr











 Category Date Time Status


 


 CULTURE URINE [RM] Stat Lab  04/25/21 16:16 Received











Labs: 


                                Laboratory Tests











  04/25/21 Range/Units





  16:16 


 


Urine Color  YELLOW  


 


Urine Appearance  SLT CLOUDY  


 


Urine pH  6.0  (5.0-8.0)  


 


Ur Specific Gravity  1.020  (1.001-1.035)  


 


Urine Protein  NEGATIVE  (NEGATIVE)  mg/dL


 


Urine Glucose (UA)  NEGATIVE  (NEGATIVE)  mg/dL


 


Urine Ketones  NEGATIVE  (NEGATIVE)  mg/dL


 


Urine Occult Blood  NEGATIVE  (NEGATIVE)  


 


Urine Nitrite  NEGATIVE  (NEGATIVE)  


 


Urine Bilirubin  NEGATIVE  (NEGATIVE)  


 


Urine Urobilinogen  1.0  (<2.0)  EU/dL


 


Ur Leukocyte Esterase  SMALL H  (NEGATIVE)  


 


Urine RBC  0-2  (0-2/HPF)  


 


Urine WBC  22-26  (0-5/HPF)  


 


Ur Epithelial Cells  RARE  (NONE-FEW)  


 


Amorphous Sediment  FEW  (NEGATIVE)  


 


Urine Bacteria  2+ H  (NEGATIVE)  


 


Urine Mucus  FEW  (NONE-MOD)  











Meds: 


Medications














Discontinued Medications














Generic Name Dose Route Start Last Admin





  Trade Name Freq  PRN Reason Stop Dose Admin


 


Ketorolac Tromethamine  60 mg  04/25/21 14:23  04/25/21 15:24





  Ketorolac 60 Mg/2 Ml Sdv  IM  04/25/21 14:24  60 mg





  ONETIME ONE   Administration


 


Oxycodone HCl  5 mg  04/25/21 14:24  04/25/21 15:25





  Oxycodone 5 Mg Tab  PO  04/25/21 14:25  5 mg





  ONETIME ONE   Administration














Departure





- Departure


Time of Disposition: 16:14


Disposition: Home, Self-Care 01


Clinical Impression: 


Low back pain


Qualifiers:


 Chronicity: acute Back pain laterality: bilateral Sciatica presence: with 

sciatica Sciatica laterality: sciatica of right side Qualified Code(s): M54.41 -

 Lumbago with sciatica, right side





Urinary tract infection


Qualifiers:


 Urinary tract infection type: acute cystitis Hematuria presence: without 

hematuria Qualified Code(s): N30.00 - Acute cystitis without hematuria








- Discharge Information


Prescriptions: 


Lidocaine 5% [Lidoderm 5%] 1 patch TOP DAILY PRN #4 patch


 PRN Reason: Pain (Mild 1-3)


traMADol [Ultram] 50 mg PO Q6H PRN #10 tab


 PRN Reason: Pain (Severe 7-10)


Diclofenac Sodium [Voltaren] 75 mg PO BIDMEALS PRN #15 tab.cr


 PRN Reason: Pain


Instructions:  Chronic Back Pain, Easy-to-Read


Referrals: 


Alberto Khan MD [Primary Care Provider] - 


Forms:  ED Department Discharge


Additional Instructions: 


The following information is given to patients seen in the emergency department 

who are being discharged to home. This information is to outline your options 

for follow-up care. We provide all patients seen in our emergency department 

with a follow-up referral.





The need for follow-up, as well as the timing and circumstances, are variable 

depending upon the specifics of your emergency department visit.





If you don't have a primary care physician on staff, we will provide you with a 

referral. We always advise you to contact your personal physician following an 

emergency department visit to inform them of the circumstance of the visit and 

for follow-up with them and/or the need for any referrals to a consulting 

specialist.





The emergency department will also refer you to a specialist when appropriate. 

This referral assures that you have the opportunity for follow-up care with a 

specialist. All of these measure are taken in an effort to provide you with 

optimal care, which includes your follow-up.





Under all circumstances we always encourage you to contact your private 

physician who remains a resource for coordinating your care. When calling for 

follow-up care, please make the office aware that this follow-up is from your 

recent emergency room visit. If for any reason you are refused follow-up, please

contact the [facility] Emergency Department

at [phone] and asked to speak to the emergency department charge nurse.





[facility]


Primary Care


[address] [place] [address]


Phone: [phone]


Fax: [phone]





[address] [address]


Phone: [phone]


Fax: [phone]





1.  The medication you received today does cause drowsiness, so do not drive for

the remaining day.


2.  When resting please lay on a flat firm surface.  Limit your mobility to 

prevent muscle stiffness.  Get up to ambulate/move around/gentle stretching 

multiple times throughout the day.  May alternate heat and ice to painful areas.


3.  Tylenol as needed for back pain.  Otherwise, take the prescribed tramadol 

and diclofenac as directed.  Diclofenac as an anti-inflammatory medication so do

not take any additional NSAIDs with this medication, such as naproxen, 

ibuprofen, or Aleve.  Tramadol, this medication may cause drowsiness, so do not 

take it while driving or needing to be functioning outside of the home.


4.  Follow-up with your primary care provider as discussed.  Return to the ED as

needed and as discussed.





 











Sepsis Event Note (ED)





- Evaluation


Sepsis Screening Result: No Definite Risk





- Focused Exam


Vital Signs: 


                                   Vital Signs











  Temp Pulse Resp BP Pulse Ox


 


 04/25/21 13:19  97.0 F  92  18  136/76  91 L














- My Orders


Last 24 Hours: 


My Active Orders





04/25/21 16:16


CULTURE URINE [RM] Stat 














- Assessment/Plan


Last 24 Hours: 


My Active Orders





04/25/21 16:16


CULTURE URINE [RM] Stat

## 2022-07-05 ENCOUNTER — HOSPITAL ENCOUNTER (EMERGENCY)
Dept: HOSPITAL 56 - MW.ED | Age: 67
Discharge: HOME | End: 2022-07-05
Payer: MEDICARE

## 2022-07-05 VITALS — DIASTOLIC BLOOD PRESSURE: 50 MMHG | SYSTOLIC BLOOD PRESSURE: 123 MMHG

## 2022-07-05 VITALS — HEART RATE: 77 BPM

## 2022-07-05 DIAGNOSIS — E78.00: ICD-10-CM

## 2022-07-05 DIAGNOSIS — I10: ICD-10-CM

## 2022-07-05 DIAGNOSIS — I25.2: ICD-10-CM

## 2022-07-05 DIAGNOSIS — Z88.8: ICD-10-CM

## 2022-07-05 DIAGNOSIS — Z20.822: ICD-10-CM

## 2022-07-05 DIAGNOSIS — Z79.899: ICD-10-CM

## 2022-07-05 DIAGNOSIS — J40: Primary | ICD-10-CM

## 2022-07-05 DIAGNOSIS — E11.9: ICD-10-CM

## 2022-07-05 LAB
BUN SERPL-MCNC: 17 MG/DL (ref 7–18)
CHLORIDE SERPL-SCNC: 100 MMOL/L (ref 98–107)
CO2 SERPL-SCNC: 24 MMOL/L (ref 21–32)
EGFRCR SERPLBLD CKD-EPI 2021: 61 ML/MIN (ref 60–?)
GLUCOSE SERPL-MCNC: 200 MG/DL (ref 74–106)
POTASSIUM SERPL-SCNC: 4 MMOL/L (ref 3.5–5.1)
SODIUM SERPL-SCNC: 138 MMOL/L (ref 136–145)

## 2022-07-05 PROCEDURE — 99285 EMERGENCY DEPT VISIT HI MDM: CPT

## 2022-07-05 PROCEDURE — 85025 COMPLETE CBC W/AUTO DIFF WBC: CPT

## 2022-07-05 PROCEDURE — 83880 ASSAY OF NATRIURETIC PEPTIDE: CPT

## 2022-07-05 PROCEDURE — 80053 COMPREHEN METABOLIC PANEL: CPT

## 2022-07-05 PROCEDURE — 93005 ELECTROCARDIOGRAM TRACING: CPT

## 2022-07-05 PROCEDURE — U0002 COVID-19 LAB TEST NON-CDC: HCPCS

## 2022-07-05 PROCEDURE — 71045 X-RAY EXAM CHEST 1 VIEW: CPT

## 2022-07-05 PROCEDURE — 84484 ASSAY OF TROPONIN QUANT: CPT

## 2022-07-05 PROCEDURE — 36415 COLL VENOUS BLD VENIPUNCTURE: CPT

## 2022-12-12 ENCOUNTER — HOSPITAL ENCOUNTER (INPATIENT)
Dept: HOSPITAL 56 - MW.ED | Age: 67
LOS: 8 days | Discharge: HOME | DRG: 392 | End: 2022-12-20
Attending: INTERNAL MEDICINE | Admitting: INTERNAL MEDICINE
Payer: MEDICARE

## 2022-12-12 DIAGNOSIS — K57.32: Primary | ICD-10-CM

## 2022-12-12 DIAGNOSIS — R19.7: ICD-10-CM

## 2022-12-12 DIAGNOSIS — E11.9: ICD-10-CM

## 2022-12-12 DIAGNOSIS — Z20.822: ICD-10-CM

## 2022-12-12 DIAGNOSIS — I25.10: ICD-10-CM

## 2022-12-12 DIAGNOSIS — I10: ICD-10-CM

## 2022-12-12 DIAGNOSIS — E78.00: ICD-10-CM

## 2022-12-12 DIAGNOSIS — Z95.5: ICD-10-CM

## 2022-12-12 DIAGNOSIS — Z91.09: ICD-10-CM

## 2022-12-12 DIAGNOSIS — E87.6: ICD-10-CM

## 2022-12-12 DIAGNOSIS — Z86.16: ICD-10-CM

## 2022-12-12 DIAGNOSIS — Z79.890: ICD-10-CM

## 2022-12-12 DIAGNOSIS — E03.9: ICD-10-CM

## 2022-12-12 DIAGNOSIS — Z86.19: ICD-10-CM

## 2022-12-12 DIAGNOSIS — E86.0: ICD-10-CM

## 2022-12-12 DIAGNOSIS — F32.A: ICD-10-CM

## 2022-12-12 DIAGNOSIS — Z87.891: ICD-10-CM

## 2022-12-12 DIAGNOSIS — Z79.84: ICD-10-CM

## 2022-12-12 DIAGNOSIS — N30.00: ICD-10-CM

## 2022-12-12 DIAGNOSIS — Z79.899: ICD-10-CM

## 2022-12-12 DIAGNOSIS — I25.2: ICD-10-CM

## 2022-12-12 DIAGNOSIS — J44.9: ICD-10-CM

## 2022-12-12 PROCEDURE — 36415 COLL VENOUS BLD VENIPUNCTURE: CPT

## 2022-12-12 PROCEDURE — 82947 ASSAY GLUCOSE BLOOD QUANT: CPT

## 2022-12-12 PROCEDURE — 87449 NOS EACH ORGANISM AG IA: CPT

## 2022-12-12 PROCEDURE — 81001 URINALYSIS AUTO W/SCOPE: CPT

## 2022-12-12 PROCEDURE — 83735 ASSAY OF MAGNESIUM: CPT

## 2022-12-12 PROCEDURE — 84100 ASSAY OF PHOSPHORUS: CPT

## 2022-12-12 PROCEDURE — 87899 AGENT NOS ASSAY W/OPTIC: CPT

## 2022-12-12 PROCEDURE — 87324 CLOSTRIDIUM AG IA: CPT

## 2022-12-12 PROCEDURE — 87328 CRYPTOSPORIDIUM AG IA: CPT

## 2022-12-12 PROCEDURE — 87040 BLOOD CULTURE FOR BACTERIA: CPT

## 2022-12-12 PROCEDURE — 80048 BASIC METABOLIC PNL TOTAL CA: CPT

## 2022-12-12 PROCEDURE — 87086 URINE CULTURE/COLONY COUNT: CPT

## 2022-12-12 PROCEDURE — 83605 ASSAY OF LACTIC ACID: CPT

## 2022-12-12 PROCEDURE — 87329 GIARDIA AG IA: CPT

## 2022-12-12 PROCEDURE — 93005 ELECTROCARDIOGRAM TRACING: CPT

## 2022-12-12 PROCEDURE — 85025 COMPLETE CBC W/AUTO DIFF WBC: CPT

## 2022-12-12 PROCEDURE — 87045 FECES CULTURE AEROBIC BACT: CPT

## 2022-12-12 PROCEDURE — 74176 CT ABD & PELVIS W/O CONTRAST: CPT

## 2022-12-12 PROCEDURE — 0241U: CPT

## 2022-12-12 PROCEDURE — 80053 COMPREHEN METABOLIC PANEL: CPT

## 2022-12-12 PROCEDURE — 87046 STOOL CULTR AEROBIC BACT EA: CPT

## 2022-12-13 LAB
BUN SERPL-MCNC: 33 MG/DL (ref 7–18)
BUN SERPL-MCNC: 34 MG/DL (ref 7–18)
BUN SERPL-MCNC: 35 MG/DL (ref 7–18)
CHLORIDE SERPL-SCNC: 94 MMOL/L (ref 98–107)
CHLORIDE SERPL-SCNC: 95 MMOL/L (ref 98–107)
CHLORIDE SERPL-SCNC: 96 MMOL/L (ref 98–107)
CO2 SERPL-SCNC: 19.2 MMOL/L (ref 21–32)
CO2 SERPL-SCNC: 20.3 MMOL/L (ref 21–32)
CO2 SERPL-SCNC: 23.3 MMOL/L (ref 21–32)
EGFRCR SERPLBLD CKD-EPI 2021: 41 ML/MIN (ref 60–?)
EGFRCR SERPLBLD CKD-EPI 2021: 41 ML/MIN (ref 60–?)
EGFRCR SERPLBLD CKD-EPI 2021: 45 ML/MIN (ref 60–?)
FLUAV RNA UPPER RESP QL NAA+PROBE: NEGATIVE
FLUBV RNA UPPER RESP QL NAA+PROBE: NEGATIVE
GLUCOSE SERPL-MCNC: 194 MG/DL (ref 74–106)
GLUCOSE SERPL-MCNC: 198 MG/DL (ref 74–106)
GLUCOSE SERPL-MCNC: 214 MG/DL (ref 74–106)
POTASSIUM SERPL-SCNC: 3 MMOL/L (ref 3.5–5.1)
POTASSIUM SERPL-SCNC: 3.1 MMOL/L (ref 3.5–5.1)
POTASSIUM SERPL-SCNC: 3.2 MMOL/L (ref 3.5–5.1)
RSV RNA UPPER RESP QL NAA+PROBE: NEGATIVE
SARS-COV-2 RNA RESP QL NAA+PROBE: NEGATIVE
SODIUM SERPL-SCNC: 129 MMOL/L (ref 136–145)
SODIUM SERPL-SCNC: 129 MMOL/L (ref 136–145)
SODIUM SERPL-SCNC: 131 MMOL/L (ref 136–145)

## 2022-12-13 RX ADMIN — METRONIDAZOLE SCH MLS/HR: 500 SOLUTION INTRAVENOUS at 18:03

## 2022-12-13 RX ADMIN — METRONIDAZOLE SCH MLS/HR: 500 SOLUTION INTRAVENOUS at 13:24

## 2022-12-13 RX ADMIN — OMEPRAZOLE SCH MG: 20 CAPSULE, DELAYED RELEASE ORAL at 20:32

## 2022-12-13 RX ADMIN — CIPROFLOXACIN SCH MLS/HR: 2 INJECTION, SOLUTION INTRAVENOUS at 13:24

## 2022-12-13 RX ADMIN — VENLAFAXINE HYDROCHLORIDE SCH MG: 75 CAPSULE, EXTENDED RELEASE ORAL at 09:31

## 2022-12-14 LAB
BUN SERPL-MCNC: 12 MG/DL (ref 7–18)
BUN SERPL-MCNC: 15 MG/DL (ref 7–18)
CHLORIDE SERPL-SCNC: 95 MMOL/L (ref 98–107)
CHLORIDE SERPL-SCNC: 96 MMOL/L (ref 98–107)
CO2 SERPL-SCNC: 22.1 MMOL/L (ref 21–32)
CO2 SERPL-SCNC: 24.2 MMOL/L (ref 21–32)
EGFRCR SERPLBLD CKD-EPI 2021: 69 ML/MIN (ref 60–?)
EGFRCR SERPLBLD CKD-EPI 2021: 69 ML/MIN (ref 60–?)
GLUCOSE SERPL-MCNC: 145 MG/DL (ref 74–106)
GLUCOSE SERPL-MCNC: 167 MG/DL (ref 74–106)
POTASSIUM SERPL-SCNC: 2.6 MMOL/L (ref 3.5–5.1)
POTASSIUM SERPL-SCNC: 2.8 MMOL/L (ref 3.5–5.1)
SODIUM SERPL-SCNC: 129 MMOL/L (ref 136–145)
SODIUM SERPL-SCNC: 130 MMOL/L (ref 136–145)

## 2022-12-14 RX ADMIN — CIPROFLOXACIN SCH MLS/HR: 2 INJECTION, SOLUTION INTRAVENOUS at 14:06

## 2022-12-14 RX ADMIN — VENLAFAXINE HYDROCHLORIDE SCH MG: 75 CAPSULE, EXTENDED RELEASE ORAL at 09:04

## 2022-12-14 RX ADMIN — NYSTATIN SCH GM: 100000 POWDER TOPICAL at 10:08

## 2022-12-14 RX ADMIN — NYSTATIN SCH GM: 100000 POWDER TOPICAL at 20:41

## 2022-12-14 RX ADMIN — ONDANSETRON PRN MG: 2 INJECTION, SOLUTION INTRAMUSCULAR; INTRAVENOUS at 10:07

## 2022-12-14 RX ADMIN — METRONIDAZOLE SCH MLS/HR: 500 SOLUTION INTRAVENOUS at 18:09

## 2022-12-14 RX ADMIN — METRONIDAZOLE SCH MLS/HR: 500 SOLUTION INTRAVENOUS at 13:02

## 2022-12-14 RX ADMIN — OMEPRAZOLE SCH MG: 20 CAPSULE, DELAYED RELEASE ORAL at 20:40

## 2022-12-14 RX ADMIN — METRONIDAZOLE SCH MLS/HR: 500 SOLUTION INTRAVENOUS at 00:33

## 2022-12-14 RX ADMIN — METRONIDAZOLE SCH MLS/HR: 500 SOLUTION INTRAVENOUS at 06:44

## 2022-12-14 RX ADMIN — NYSTATIN SCH GM: 100000 POWDER TOPICAL at 14:07

## 2022-12-14 RX ADMIN — NYSTATIN SCH GM: 100000 POWDER TOPICAL at 22:45

## 2022-12-14 RX ADMIN — CIPROFLOXACIN SCH MLS/HR: 2 INJECTION, SOLUTION INTRAVENOUS at 01:51

## 2022-12-15 LAB
BUN SERPL-MCNC: 8 MG/DL (ref 7–18)
BUN SERPL-MCNC: 9 MG/DL (ref 7–18)
CHLORIDE SERPL-SCNC: 101 MMOL/L (ref 98–107)
CHLORIDE SERPL-SCNC: 101 MMOL/L (ref 98–107)
CO2 SERPL-SCNC: 24 MMOL/L (ref 21–32)
CO2 SERPL-SCNC: 25.3 MMOL/L (ref 21–32)
EGFRCR SERPLBLD CKD-EPI 2021: 80 ML/MIN (ref 60–?)
EGFRCR SERPLBLD CKD-EPI 2021: 80 ML/MIN (ref 60–?)
GLUCOSE SERPL-MCNC: 140 MG/DL (ref 74–106)
GLUCOSE SERPL-MCNC: 143 MG/DL (ref 74–106)
POTASSIUM SERPL-SCNC: 2.7 MMOL/L (ref 3.5–5.1)
POTASSIUM SERPL-SCNC: 3.1 MMOL/L (ref 3.5–5.1)
SODIUM SERPL-SCNC: 133 MMOL/L (ref 136–145)
SODIUM SERPL-SCNC: 135 MMOL/L (ref 136–145)

## 2022-12-15 RX ADMIN — NYSTATIN SCH GM: 100000 POWDER TOPICAL at 05:52

## 2022-12-15 RX ADMIN — NYSTATIN SCH GM: 100000 POWDER TOPICAL at 14:29

## 2022-12-15 RX ADMIN — NYSTATIN SCH GM: 100000 POWDER TOPICAL at 22:57

## 2022-12-15 RX ADMIN — ONDANSETRON PRN MG: 2 INJECTION, SOLUTION INTRAMUSCULAR; INTRAVENOUS at 18:19

## 2022-12-15 RX ADMIN — METRONIDAZOLE SCH MLS/HR: 500 SOLUTION INTRAVENOUS at 12:47

## 2022-12-15 RX ADMIN — CIPROFLOXACIN SCH MLS/HR: 2 INJECTION, SOLUTION INTRAVENOUS at 01:21

## 2022-12-15 RX ADMIN — OMEPRAZOLE SCH MG: 20 CAPSULE, DELAYED RELEASE ORAL at 20:14

## 2022-12-15 RX ADMIN — CIPROFLOXACIN SCH MLS/HR: 2 INJECTION, SOLUTION INTRAVENOUS at 14:28

## 2022-12-15 RX ADMIN — METRONIDAZOLE SCH MLS/HR: 500 SOLUTION INTRAVENOUS at 06:00

## 2022-12-15 RX ADMIN — VENLAFAXINE HYDROCHLORIDE SCH MG: 75 CAPSULE, EXTENDED RELEASE ORAL at 09:26

## 2022-12-15 RX ADMIN — METRONIDAZOLE SCH MLS/HR: 500 SOLUTION INTRAVENOUS at 00:02

## 2022-12-15 RX ADMIN — ONDANSETRON PRN MG: 2 INJECTION, SOLUTION INTRAMUSCULAR; INTRAVENOUS at 08:04

## 2022-12-15 RX ADMIN — METRONIDAZOLE SCH MLS/HR: 500 SOLUTION INTRAVENOUS at 18:18

## 2022-12-16 LAB
BUN SERPL-MCNC: 7 MG/DL (ref 7–18)
BUN SERPL-MCNC: 8 MG/DL (ref 7–18)
CHLORIDE SERPL-SCNC: 100 MMOL/L (ref 98–107)
CHLORIDE SERPL-SCNC: 99 MMOL/L (ref 98–107)
CO2 SERPL-SCNC: 25.9 MMOL/L (ref 21–32)
CO2 SERPL-SCNC: 26.6 MMOL/L (ref 21–32)
EGFRCR SERPLBLD CKD-EPI 2021: 80 ML/MIN (ref 60–?)
EGFRCR SERPLBLD CKD-EPI 2021: 94 ML/MIN (ref 60–?)
GLUCOSE SERPL-MCNC: 136 MG/DL (ref 74–106)
GLUCOSE SERPL-MCNC: 162 MG/DL (ref 74–106)
POTASSIUM SERPL-SCNC: 2.8 MMOL/L (ref 3.5–5.1)
POTASSIUM SERPL-SCNC: 3.2 MMOL/L (ref 3.5–5.1)
SODIUM SERPL-SCNC: 133 MMOL/L (ref 136–145)
SODIUM SERPL-SCNC: 136 MMOL/L (ref 136–145)

## 2022-12-16 RX ADMIN — NYSTATIN SCH APPLIC: 100000 POWDER TOPICAL at 14:49

## 2022-12-16 RX ADMIN — OMEPRAZOLE SCH MG: 20 CAPSULE, DELAYED RELEASE ORAL at 21:35

## 2022-12-16 RX ADMIN — METRONIDAZOLE SCH MLS/HR: 500 SOLUTION INTRAVENOUS at 06:12

## 2022-12-16 RX ADMIN — METRONIDAZOLE SCH MLS/HR: 500 SOLUTION INTRAVENOUS at 13:16

## 2022-12-16 RX ADMIN — METRONIDAZOLE SCH MLS/HR: 500 SOLUTION INTRAVENOUS at 00:37

## 2022-12-16 RX ADMIN — VENLAFAXINE HYDROCHLORIDE SCH MG: 75 CAPSULE, EXTENDED RELEASE ORAL at 09:01

## 2022-12-16 RX ADMIN — CIPROFLOXACIN SCH MLS/HR: 2 INJECTION, SOLUTION INTRAVENOUS at 14:46

## 2022-12-16 RX ADMIN — CIPROFLOXACIN SCH MLS/HR: 2 INJECTION, SOLUTION INTRAVENOUS at 01:44

## 2022-12-16 RX ADMIN — NYSTATIN SCH APPLIC: 100000 POWDER TOPICAL at 21:36

## 2022-12-16 RX ADMIN — METRONIDAZOLE SCH MLS/HR: 500 SOLUTION INTRAVENOUS at 19:33

## 2022-12-16 RX ADMIN — NYSTATIN SCH GM: 100000 POWDER TOPICAL at 06:12

## 2022-12-17 LAB
BUN SERPL-MCNC: 5 MG/DL (ref 7–18)
CHLORIDE SERPL-SCNC: 102 MMOL/L (ref 98–107)
CO2 SERPL-SCNC: 26.3 MMOL/L (ref 21–32)
EGFRCR SERPLBLD CKD-EPI 2021: 94 ML/MIN (ref 60–?)
GLUCOSE SERPL-MCNC: 156 MG/DL (ref 74–106)
POTASSIUM SERPL-SCNC: 3.3 MMOL/L (ref 3.5–5.1)
SODIUM SERPL-SCNC: 138 MMOL/L (ref 136–145)

## 2022-12-17 RX ADMIN — CIPROFLOXACIN SCH MLS/HR: 2 INJECTION, SOLUTION INTRAVENOUS at 02:03

## 2022-12-17 RX ADMIN — NYSTATIN SCH APPLIC: 100000 POWDER TOPICAL at 21:09

## 2022-12-17 RX ADMIN — METRONIDAZOLE SCH MLS/HR: 500 SOLUTION INTRAVENOUS at 18:52

## 2022-12-17 RX ADMIN — METRONIDAZOLE SCH MLS/HR: 500 SOLUTION INTRAVENOUS at 00:47

## 2022-12-17 RX ADMIN — CIPROFLOXACIN SCH MLS/HR: 2 INJECTION, SOLUTION INTRAVENOUS at 15:04

## 2022-12-17 RX ADMIN — NYSTATIN SCH APPLIC: 100000 POWDER TOPICAL at 15:05

## 2022-12-17 RX ADMIN — VENLAFAXINE HYDROCHLORIDE SCH MG: 75 CAPSULE, EXTENDED RELEASE ORAL at 08:59

## 2022-12-17 RX ADMIN — METRONIDAZOLE SCH MLS/HR: 500 SOLUTION INTRAVENOUS at 06:42

## 2022-12-17 RX ADMIN — OMEPRAZOLE SCH MG: 20 CAPSULE, DELAYED RELEASE ORAL at 21:08

## 2022-12-17 RX ADMIN — METRONIDAZOLE SCH MLS/HR: 500 SOLUTION INTRAVENOUS at 14:25

## 2022-12-17 RX ADMIN — NYSTATIN SCH APPLIC: 100000 POWDER TOPICAL at 05:47

## 2022-12-18 LAB
BUN SERPL-MCNC: 5 MG/DL (ref 7–18)
CHLORIDE SERPL-SCNC: 101 MMOL/L (ref 98–107)
CO2 SERPL-SCNC: 27.7 MMOL/L (ref 21–32)
EGFRCR SERPLBLD CKD-EPI 2021: 97 ML/MIN (ref 60–?)
GLUCOSE SERPL-MCNC: 165 MG/DL (ref 74–106)
POTASSIUM SERPL-SCNC: 2.8 MMOL/L (ref 3.5–5.1)
SODIUM SERPL-SCNC: 138 MMOL/L (ref 136–145)

## 2022-12-18 RX ADMIN — METRONIDAZOLE SCH MLS/HR: 500 SOLUTION INTRAVENOUS at 06:53

## 2022-12-18 RX ADMIN — NYSTATIN SCH APPLIC: 100000 POWDER TOPICAL at 14:18

## 2022-12-18 RX ADMIN — VANCOMYCIN HYDROCHLORIDE SCH MG: 125 CAPSULE ORAL at 15:16

## 2022-12-18 RX ADMIN — VANCOMYCIN HYDROCHLORIDE SCH MG: 125 CAPSULE ORAL at 17:42

## 2022-12-18 RX ADMIN — NYSTATIN SCH APPLIC: 100000 POWDER TOPICAL at 06:55

## 2022-12-18 RX ADMIN — OMEPRAZOLE SCH MG: 20 CAPSULE, DELAYED RELEASE ORAL at 21:10

## 2022-12-18 RX ADMIN — NYSTATIN SCH APPLIC: 100000 POWDER TOPICAL at 21:16

## 2022-12-18 RX ADMIN — VENLAFAXINE HYDROCHLORIDE SCH MG: 75 CAPSULE, EXTENDED RELEASE ORAL at 09:43

## 2022-12-18 RX ADMIN — CIPROFLOXACIN SCH MLS/HR: 2 INJECTION, SOLUTION INTRAVENOUS at 02:25

## 2022-12-18 RX ADMIN — METRONIDAZOLE SCH MLS/HR: 500 SOLUTION INTRAVENOUS at 12:30

## 2022-12-18 RX ADMIN — METRONIDAZOLE SCH MLS/HR: 500 SOLUTION INTRAVENOUS at 01:00

## 2022-12-19 LAB
BUN SERPL-MCNC: 4 MG/DL (ref 7–18)
CHLORIDE SERPL-SCNC: 103 MMOL/L (ref 98–107)
CO2 SERPL-SCNC: 25.9 MMOL/L (ref 21–32)
EGFRCR SERPLBLD CKD-EPI 2021: 80 ML/MIN (ref 60–?)
GLUCOSE SERPL-MCNC: 142 MG/DL (ref 74–106)
POTASSIUM SERPL-SCNC: 3.3 MMOL/L (ref 3.5–5.1)
SODIUM SERPL-SCNC: 139 MMOL/L (ref 136–145)

## 2022-12-19 RX ADMIN — POTASSIUM CHLORIDE SCH MEQ: 1500 TABLET, EXTENDED RELEASE ORAL at 08:19

## 2022-12-19 RX ADMIN — VANCOMYCIN HYDROCHLORIDE SCH MG: 125 CAPSULE ORAL at 00:35

## 2022-12-19 RX ADMIN — NYSTATIN SCH APPLIC: 100000 POWDER TOPICAL at 21:29

## 2022-12-19 RX ADMIN — VANCOMYCIN HYDROCHLORIDE SCH MG: 125 CAPSULE ORAL at 12:06

## 2022-12-19 RX ADMIN — NYSTATIN SCH APPLIC: 100000 POWDER TOPICAL at 06:07

## 2022-12-19 RX ADMIN — VANCOMYCIN HYDROCHLORIDE SCH MG: 125 CAPSULE ORAL at 18:16

## 2022-12-19 RX ADMIN — OMEPRAZOLE SCH MG: 20 CAPSULE, DELAYED RELEASE ORAL at 21:29

## 2022-12-19 RX ADMIN — NYSTATIN SCH APPLIC: 100000 POWDER TOPICAL at 13:04

## 2022-12-19 RX ADMIN — VENLAFAXINE HYDROCHLORIDE SCH MG: 75 CAPSULE, EXTENDED RELEASE ORAL at 08:19

## 2022-12-19 RX ADMIN — VANCOMYCIN HYDROCHLORIDE SCH MG: 125 CAPSULE ORAL at 06:54

## 2022-12-20 VITALS — DIASTOLIC BLOOD PRESSURE: 70 MMHG | HEART RATE: 81 BPM | SYSTOLIC BLOOD PRESSURE: 158 MMHG

## 2022-12-20 LAB
BUN SERPL-MCNC: 3 MG/DL (ref 7–18)
CHLORIDE SERPL-SCNC: 105 MMOL/L (ref 98–107)
CO2 SERPL-SCNC: 27 MMOL/L (ref 21–32)
EGFRCR SERPLBLD CKD-EPI 2021: 94 ML/MIN (ref 60–?)
GLUCOSE SERPL-MCNC: 140 MG/DL (ref 74–106)
POTASSIUM SERPL-SCNC: 3.4 MMOL/L (ref 3.5–5.1)
SODIUM SERPL-SCNC: 140 MMOL/L (ref 136–145)

## 2022-12-20 RX ADMIN — NYSTATIN SCH APPLIC: 100000 POWDER TOPICAL at 07:07

## 2022-12-20 RX ADMIN — VANCOMYCIN HYDROCHLORIDE SCH MG: 125 CAPSULE ORAL at 00:05

## 2022-12-20 RX ADMIN — VENLAFAXINE HYDROCHLORIDE SCH MG: 75 CAPSULE, EXTENDED RELEASE ORAL at 08:29

## 2022-12-20 RX ADMIN — VANCOMYCIN HYDROCHLORIDE SCH MG: 125 CAPSULE ORAL at 07:08

## 2022-12-20 RX ADMIN — VANCOMYCIN HYDROCHLORIDE SCH MG: 125 CAPSULE ORAL at 12:54

## 2022-12-20 RX ADMIN — NYSTATIN SCH APPLIC: 100000 POWDER TOPICAL at 14:11

## 2022-12-20 RX ADMIN — POTASSIUM CHLORIDE SCH MEQ: 1500 TABLET, EXTENDED RELEASE ORAL at 08:29

## 2023-08-24 ENCOUNTER — HOSPITAL ENCOUNTER (OUTPATIENT)
Dept: HOSPITAL 56 - MW.SDS | Age: 68
Discharge: HOME | End: 2023-08-24
Attending: SURGERY
Payer: MEDICARE

## 2023-08-24 VITALS — SYSTOLIC BLOOD PRESSURE: 120 MMHG | DIASTOLIC BLOOD PRESSURE: 59 MMHG | HEART RATE: 69 BPM

## 2023-08-24 DIAGNOSIS — Z87.891: ICD-10-CM

## 2023-08-24 DIAGNOSIS — Z91.041: ICD-10-CM

## 2023-08-24 DIAGNOSIS — K57.90: ICD-10-CM

## 2023-08-24 DIAGNOSIS — Z86.010: ICD-10-CM

## 2023-08-24 DIAGNOSIS — E03.9: ICD-10-CM

## 2023-08-24 DIAGNOSIS — J44.9: ICD-10-CM

## 2023-08-24 DIAGNOSIS — M10.9: ICD-10-CM

## 2023-08-24 DIAGNOSIS — K21.9: ICD-10-CM

## 2023-08-24 DIAGNOSIS — F41.9: ICD-10-CM

## 2023-08-24 DIAGNOSIS — E11.9: ICD-10-CM

## 2023-08-24 DIAGNOSIS — Z79.82: ICD-10-CM

## 2023-08-24 DIAGNOSIS — Z79.84: ICD-10-CM

## 2023-08-24 DIAGNOSIS — F32.A: ICD-10-CM

## 2023-08-24 DIAGNOSIS — Z87.19: ICD-10-CM

## 2023-08-24 DIAGNOSIS — K57.30: Primary | ICD-10-CM

## 2023-08-24 DIAGNOSIS — R20.2: ICD-10-CM

## 2023-08-24 DIAGNOSIS — I25.2: ICD-10-CM

## 2023-08-24 DIAGNOSIS — I25.10: ICD-10-CM

## 2023-08-24 DIAGNOSIS — R09.89: ICD-10-CM

## 2023-08-24 DIAGNOSIS — E66.9: ICD-10-CM

## 2023-08-24 DIAGNOSIS — J45.909: ICD-10-CM

## 2023-08-24 DIAGNOSIS — Z79.890: ICD-10-CM

## 2023-08-24 DIAGNOSIS — Z88.8: ICD-10-CM

## 2023-08-24 DIAGNOSIS — M19.079: ICD-10-CM

## 2023-08-24 DIAGNOSIS — Z79.899: ICD-10-CM

## 2023-08-24 DIAGNOSIS — K52.9: ICD-10-CM

## 2023-08-24 DIAGNOSIS — Z98.890: ICD-10-CM

## 2023-08-24 DIAGNOSIS — Z98.51: ICD-10-CM

## 2023-08-24 DIAGNOSIS — I10: ICD-10-CM

## 2023-08-24 DIAGNOSIS — E78.00: ICD-10-CM

## 2023-08-24 PROCEDURE — 45380 COLONOSCOPY AND BIOPSY: CPT

## 2023-08-24 PROCEDURE — 82947 ASSAY GLUCOSE BLOOD QUANT: CPT

## 2023-08-24 PROCEDURE — 88305 TISSUE EXAM BY PATHOLOGIST: CPT

## 2024-12-16 ENCOUNTER — HOSPITAL ENCOUNTER (EMERGENCY)
Dept: HOSPITAL 56 - MW.ED | Age: 69
Discharge: HOME | End: 2024-12-16
Payer: MEDICARE

## 2024-12-16 VITALS — DIASTOLIC BLOOD PRESSURE: 59 MMHG | SYSTOLIC BLOOD PRESSURE: 157 MMHG

## 2024-12-16 VITALS — HEART RATE: 81 BPM

## 2024-12-16 DIAGNOSIS — E11.9: ICD-10-CM

## 2024-12-16 DIAGNOSIS — Z87.891: ICD-10-CM

## 2024-12-16 DIAGNOSIS — E66.9: ICD-10-CM

## 2024-12-16 DIAGNOSIS — Z79.82: ICD-10-CM

## 2024-12-16 DIAGNOSIS — I10: ICD-10-CM

## 2024-12-16 DIAGNOSIS — I25.2: ICD-10-CM

## 2024-12-16 DIAGNOSIS — Z79.84: ICD-10-CM

## 2024-12-16 DIAGNOSIS — Z88.8: ICD-10-CM

## 2024-12-16 DIAGNOSIS — M19.90: ICD-10-CM

## 2024-12-16 DIAGNOSIS — Z91.041: ICD-10-CM

## 2024-12-16 DIAGNOSIS — M62.838: Primary | ICD-10-CM

## 2024-12-16 DIAGNOSIS — Z91.048: ICD-10-CM

## 2024-12-16 DIAGNOSIS — E78.00: ICD-10-CM

## 2024-12-16 DIAGNOSIS — E03.9: ICD-10-CM

## 2024-12-16 DIAGNOSIS — K21.9: ICD-10-CM

## 2024-12-16 DIAGNOSIS — G89.29: ICD-10-CM

## 2024-12-16 DIAGNOSIS — Z79.899: ICD-10-CM

## 2024-12-16 DIAGNOSIS — Z79.890: ICD-10-CM

## 2024-12-16 PROCEDURE — 96372 THER/PROPH/DIAG INJ SC/IM: CPT

## 2024-12-16 PROCEDURE — 99284 EMERGENCY DEPT VISIT MOD MDM: CPT

## 2024-12-16 PROCEDURE — 73562 X-RAY EXAM OF KNEE 3: CPT

## 2024-12-16 PROCEDURE — 93971 EXTREMITY STUDY: CPT

## 2024-12-16 RX ADMIN — KETOROLAC TROMETHAMINE ONE MG: 30 INJECTION, SOLUTION INTRAMUSCULAR at 07:16
